# Patient Record
Sex: MALE | Race: BLACK OR AFRICAN AMERICAN
[De-identification: names, ages, dates, MRNs, and addresses within clinical notes are randomized per-mention and may not be internally consistent; named-entity substitution may affect disease eponyms.]

---

## 2017-03-06 NOTE — RAD
EXAM: Chest one view.



HISTORY: Chest pain.



COMPARISON: 4/2/2016.



FINDINGS: A frontal view of the chest is obtained.    



There are no confluent infiltrates. There are calcified granulomas in both

yandy and the left upper lobe. There is no pneumothorax or pleural effusion.

The heart is not enlarged.    



IMPRESSION: 

1. No confluent infiltrates.
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
## 2017-03-13 NOTE — RAD
Indication: Chest pain



Technique: Upright portable chest radiograph was obtained.  Comparison is from

March 6, 2017.



Findings: The lungs are clear.  The cardiopulmonary silhouette is within

normal limits.  The bony structures are intact. Leads overlie the patient.



Impression:

No active pulmonary disease.

## 2017-03-13 NOTE — EKG
[facility]

               [address] [place] [address]

Test Date:    2017               Test Time:    16:18:44

Pat Name:     TRINA NOLAN             Department:   

Patient ID:   [id number]           Room:          

Gender:       M                        Technician:   

:          1980               Requested By: ERIK MURILLO

Order Number: [id number]            Reading MD:   Alyssa Alonzo

                                 Measurements

Intervals                              Axis          

Rate:         90                       P:            22

IA:           166                      QRS:          49

QRSD:         84                       T:            8

QT:           332                                    

QTc:          410                                    

                           Interpretive Statements

SINUS RHYTHM

QRS(T) CONTOUR ABNORMALITY

CONSISTENT WITH ANTEROSEPTAL INFARCT

PROBABLY OLD

RI6.01          Unconfirmed report

No previous ECG available for comparison



Electronically Signed On 3- 10:41:55 CDT by Alyssa Alonzo
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
## 2017-12-30 ENCOUNTER — HOSPITAL ENCOUNTER (EMERGENCY)
Dept: HOSPITAL 61 - ER | Age: 37
Discharge: HOME | End: 2017-12-30
Payer: SELF-PAY

## 2017-12-30 DIAGNOSIS — F17.210: ICD-10-CM

## 2017-12-30 DIAGNOSIS — R10.84: Primary | ICD-10-CM

## 2017-12-30 DIAGNOSIS — I10: ICD-10-CM

## 2017-12-30 DIAGNOSIS — Z88.8: ICD-10-CM

## 2017-12-30 DIAGNOSIS — F12.10: ICD-10-CM

## 2017-12-30 DIAGNOSIS — Z88.6: ICD-10-CM

## 2017-12-30 DIAGNOSIS — R11.0: ICD-10-CM

## 2017-12-30 DIAGNOSIS — K21.9: ICD-10-CM

## 2017-12-30 LAB
ADD MAN DIFF?: NO
ALBUMIN SERPL-MCNC: 4 G/DL (ref 3.4–5)
ALBUMIN/GLOB SERPL: 1 {RATIO} (ref 1–1.7)
ALP SERPL-CCNC: 61 U/L (ref 46–116)
ALT (SGPT): 83 U/L (ref 16–63)
AMYLASE: 105 U/L (ref 25–115)
ANION GAP SERPL CALC-SCNC: 11 MMOL/L (ref 6–14)
AST SERPL-CCNC: 44 U/L (ref 15–37)
BACTERIA #/AREA URNS HPF: 0 /HPF
BARBITURATES: (no result)
BASO #: 0 X10^3/UL (ref 0–0.2)
BASO %: 1 % (ref 0–3)
BENZODIAZEPINES: (no result)
BILIRUBIN,URINE: NEGATIVE
BLOOD UREA NITROGEN: 16 MG/DL (ref 8–26)
BUN/CREAT SERPL: 18 (ref 6–20)
CALCIUM: 8.7 MG/DL (ref 8.5–10.1)
CANNABINOIDS: (no result)
CHLORIDE: 103 MMOL/L (ref 98–107)
CO2 SERPL-SCNC: 25 MMOL/L (ref 21–32)
COCAINE: (no result)
CREAT SERPL-MCNC: 0.9 MG/DL (ref 0.7–1.3)
EOS %: 3 % (ref 0–3)
ETHANOL, URINE: (no result)
GFR SERPLBLD BASED ON 1.73 SQ M-ARVRAT: 114.3 ML/MIN
GLOBULIN SER-MCNC: 4 G/DL (ref 2.2–3.8)
GLUCOSE SERPL-MCNC: 104 MG/DL (ref 70–99)
GLUCOSE,URINE: NEGATIVE MG/DL
HCG SERPL-ACNC: 3.9 X10^3/UL (ref 4–11)
HEMATOCRIT: 49.1 % (ref 39–53)
HEMOGLOBIN: 16.6 G/DL (ref 13–17.5)
LYMPH #: 2 X10^3/UL (ref 1–4.8)
LYMPH %: 51 % (ref 24–48)
MEAN CORPUSCULAR HEMOGLOBIN: 30 PG (ref 25–35)
MEAN CORPUSCULAR HGB CONC: 34 G/DL (ref 31–37)
MEAN CORPUSCULAR VOLUME: 89 FL (ref 79–100)
METHADONE: (no result)
MONO %: 10 % (ref 0–9)
NEUT %: 35 % (ref 31–73)
NITRITE UR QL STRIP: NEGATIVE
OPIATES: (no result)
PH,URINE: 7
PHENCYCLIDINE: (no result)
PLATELET COUNT: 165 X10^3/UL (ref 140–400)
POTASSIUM SERPL-SCNC: 4.3 MMOL/L (ref 3.5–5.1)
PROTEIN,URINE: NEGATIVE MG/DL
RBC,URINE: 0 /HPF (ref 0–2)
RED BLOOD COUNT: 5.5 X10^6/UL (ref 4.3–5.7)
RED CELL DISTRIBUTION WIDTH: 12.9 % (ref 11.5–14.5)
SODIUM: 139 MMOL/L (ref 136–145)
SPECIFIC GRAVITY,URINE: 1.02
SQUAMOUS EPITHELIAL CELL,UR: (no result) /LPF
TOTAL BILIRUBIN: 1 MG/DL (ref 0.2–1)
TOTAL PROTEIN: 8 G/DL (ref 6.4–8.2)
UROBILINOGEN,URINE: 1 MG/DL
WBC #/AREA URNS HPF: 0 /HPF (ref 0–4)

## 2017-12-30 PROCEDURE — 80307 DRUG TEST PRSMV CHEM ANLYZR: CPT

## 2017-12-30 PROCEDURE — 85025 COMPLETE CBC W/AUTO DIFF WBC: CPT

## 2017-12-30 PROCEDURE — 99284 EMERGENCY DEPT VISIT MOD MDM: CPT

## 2017-12-30 PROCEDURE — 83690 ASSAY OF LIPASE: CPT

## 2017-12-30 PROCEDURE — 81001 URINALYSIS AUTO W/SCOPE: CPT

## 2017-12-30 PROCEDURE — 80053 COMPREHEN METABOLIC PANEL: CPT

## 2017-12-30 PROCEDURE — 82150 ASSAY OF AMYLASE: CPT

## 2017-12-30 PROCEDURE — 96372 THER/PROPH/DIAG INJ SC/IM: CPT

## 2017-12-30 PROCEDURE — 36415 COLL VENOUS BLD VENIPUNCTURE: CPT
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
## 2020-08-17 NOTE — RAD
INDICATION: Reason: CP / Spl. Instructions:  / History: 

 

COMPARISON: March 2017

 

FINDINGS:

 

2 view of chest obtained.

No focal airspace consolidation or pulmonary edema. There are some 

calcified lymph nodes and lung nodules which can be sequela of old 

granulomatous disease. A definite new region of consolidation is not seen.

 

 

IMPRESSION:

 

*  No focal airspace consolidation or edema.

 

Electronically signed by: Talat Mckeon MD (8/17/2020 7:04 AM) 

[id number]

## 2020-08-18 NOTE — EKG
[facility]

              [address] [place] [address]

Test Date:    2020               Test Time:    05:03:20

Pat Name:     TRINA NOLAN             Department:   

Patient ID:   [id number]           Room:          

Gender:       M                        Technician:   

:          1980               Requested By: DAVE GRAMAJO

Order Number: [id number]           Reading MD:     

                                 Measurements

Intervals                              Axis          

Rate:                                  P:            

WA:                                    QRS:          

QRSD:                                  T:            

QT:                                                  

QTc:                                                 

                           Interpretive Statements
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
## 2020-09-22 NOTE — RAD
CT HEAD WO CONTRAST

 

History:Headaches for several days

 

Comparison: None.

 

Technique: Noncontrast CT imaging was performed of the head.  Exposure: 

One or more of the following individualized dose reduction techniques were

utilized for this examination:  1. Automated exposure control  2. 

Adjustment of the mA and/or kV according to patient size  3. Use of 

iterative reconstruction technique.

 

Findings: No acute extra-axial or parenchymal hemorrhage is identified. 

There is no significant intra-axial mass effect, midline shift, or 

extra-axial fluid collection. The gray-white differentiation of the major 

vascular territories is preserved.  The ventricles, sulci, and cisterns 

are within normal limits in size and configuration.   The mastoid air 

cells and the visualized paranasal sinuses are mostly aerated other than a

small polyp or mucous retention cyst of the right maxillary sinus about 4 

mm. There is medial deviation of fat emanating from the right orbit into 

the region of the ethmoid air cells, evidence of old lamina papyracea 

fracture. No acute calvarial abnormality is identified.

 

Impression:

1. No acute intracranial abnormality is identified. 

2. There is evidence of old right lamina papyracea fracture.

 

Electronically signed by: Aleksander Johnson MD (9/22/2020 11:38 AM) 

[id number]

## 2020-11-06 NOTE — RAD
KNEE 3 VIEWS LEFT

 

Clinical Indication: Reason: L knee pain x1 month after feeling pop while 

doing squats / Spl. Instructions:  / History: 

 

Comparison: None.

 

Findings: 

There is no acute fracture or dislocation. The tricompartmental joint 

spaces are maintained. The patella is in anatomic position. The 

mineralization is normal. There is no soft tissue abnormality. There is no

joint effusion.

 

IMPRESSION:

No acute fracture. 

 

Electronically signed by: Joel Melgar MD (11/6/2020 3:12 PM) [id number]
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
## 2020-11-15 NOTE — RAD
Examination: PORTABLE CHEST 1V

 

History: Reason: covid +,  soa / Spl. Instructions:  / History: 

 

Comparison/Correlation: 8/17/2020 two-view chest exam

 

Findings: Portable chest x-ray exam was performed. Gastrografin is 

present. I size and pulmonary vasculature are normal. No definite 

infiltrate or effusion. Bony structures are unremarkable. No pneumothorax.

 

 

Impression:

No definite infiltrate.

 

Electronically signed by: Juan Manuel Sultana MD (11/15/2020 12:54 PM) [id number]
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
## 2021-01-16 NOTE — RAD
Exam: Chest 2 views



INDICATION: Chest pain



TECHNIQUE: Frontal and lateral views of the chest



Comparisons: 11/15/2020



FINDINGS:

The cardiomediastinal silhouette and pulmonary vessels are within normal limits.



The lung and pleural spaces are clear.



IMPRESSION:

No acute cardiopulmonary process.



Electronically signed by: Wenceslao Umanzor MD (1/16/2021 11:27 PM) [facility]

## 2021-01-17 NOTE — EKG
[address] [place] [address]

Test Date:    2021               Test Time:    21:45:12

Pat Name:     TRINA NOLAN             Department:   

Patient ID:   [id number]           Room:          

Gender:       M                        Technician:   

:          1980               Requested By: RAFAELA COLE

Order Number: [id number]           Reading MD:     

                                 Measurements

Intervals                              Axis          

Rate:         58                       P:            52

ID:           [id number]                      QRS:          49

QRSD:         92                       T:            31

QT:           416                                    

QTc:          412                                    

                           Interpretive Statements

SINUS RHYTHM

T ABNORMALITY IN ANTEROSEPTAL LEADS

ABNORMAL ECG

RI6.01

No previous ECG available for comparison
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
## 2021-02-07 NOTE — RAD
PA and lateral chest.



HISTORY: Left chest pain



PA and lateral views were taken of the chest. Lungs are free of infiltrates. Heart is normal in size.
 There is no effusion. There is a granuloma on the left.



IMPRESSION:

1. No acute chest disease.



Electronically signed by: Trell Galindo MD (2/7/2021 12:40 PM) [facility]

## 2021-02-08 NOTE — EKG
[facility]

              [address] [place] [address]

Test Date:    2021               Test Time:    11:59:27

Pat Name:     TRINA NOLAN             Department:   

Patient ID:   [id number]           Room:          

Gender:       M                        Technician:   RANDY ER

:          1980               Requested By: NADYA LUJAN

Order Number: [id number]           Reading MD:   Zachery Finch

                                 Measurements

Intervals                              Axis          

Rate:         61                       P:            45

MN:           188                      QRS:          62

QRSD:         80                       T:            31

QT:           370                                    

QTc:          374                                    

                           Interpretive Statements

SINUS RHYTHM

LOW LIMB LEAD VOLTAGE

QRS(T) CONTOUR ABNORMALITY

CONSISTENT WITH ANTEROSEPTAL INFARCT

PROBABLY OLD

ABNORMAL ECG

Electronically Signed On 2021 9:27:49 CST by Zachery Finch
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
## 2021-06-08 NOTE — EKG
[facility]

              [address] [place] [address]

Test Date:    2021               Test Time:    03:02:55

Pat Name:     TRINA NOLAN             Department:   

Patient ID:   [id number]           Room:          

Gender:       M                        Technician:   

:          1980               Requested By: HELDER OVALLE

Order Number: [id number]           Reading MD:     

                                 Measurements

Intervals                              Axis          

Rate:         58                       P:            45

VT:           196                      QRS:          35

QRSD:         84                       T:            27

QT:           406                                    

QTc:          402                                    

                           Interpretive Statements

SINUS RHYTHM

LOW LIMB LEAD VOLTAGE

QRS(T) CONTOUR ABNORMALITY

CONSISTENT WITH ANTEROSEPTAL INFARCT

AGE UNDETERMINED

ABNORMAL ECG

RI6.02

No previous ECG available for comparison

## 2023-05-05 NOTE — EKG
[facility]

              [address] [place] [address]

Test Date:    2020-11-15               Test Time:    12:17:25

Pat Name:     TRINA NOLAN             Department:   

Patient ID:   [id number]           Room:          

Gender:       M                        Technician:   

:          1980               Requested By: NADYA LUJAN

Order Number: [id number]           Reading MD:     

                                 Measurements

Intervals                              Axis          

Rate:         101                      P:            18

OR:           158                      QRS:          55

QRSD:         84                       T:            20

QT:           300                                    

QTc:          395                                    

                           Interpretive Statements

SINUS TACHYCARDIA

QRS(T) CONTOUR ABNORMALITY

CONSISTENT WITH ANTEROSEPTAL INFARCT

PROBABLY OLD

ABNORMAL ECG

RI6.02

No previous ECG available for comparison Topical Clindamycin Counseling: Patient counseled that this medication may cause skin irritation or allergic reactions.  In the event of skin irritation, the patient was advised to reduce the amount of the drug applied or use it less frequently.   The patient verbalized understanding of the proper use and possible adverse effects of clindamycin.  All of the patient's questions and concerns were addressed.